# Patient Record
Sex: FEMALE | Race: ASIAN | Employment: FULL TIME | URBAN - METROPOLITAN AREA
[De-identification: names, ages, dates, MRNs, and addresses within clinical notes are randomized per-mention and may not be internally consistent; named-entity substitution may affect disease eponyms.]

---

## 2018-01-01 ENCOUNTER — TRANSFERRED RECORDS (OUTPATIENT)
Dept: MULTI SPECIALTY CLINIC | Facility: CLINIC | Age: 38
End: 2018-01-01

## 2018-01-01 LAB — PAP SMEAR - HIM PATIENT REPORTED: NEGATIVE

## 2019-09-06 ENCOUNTER — OFFICE VISIT (OUTPATIENT)
Dept: FAMILY MEDICINE | Facility: CLINIC | Age: 39
End: 2019-09-06
Payer: COMMERCIAL

## 2019-09-06 VITALS
WEIGHT: 153.4 LBS | TEMPERATURE: 98 F | BODY MASS INDEX: 30.12 KG/M2 | SYSTOLIC BLOOD PRESSURE: 121 MMHG | OXYGEN SATURATION: 99 % | HEART RATE: 66 BPM | HEIGHT: 60 IN | DIASTOLIC BLOOD PRESSURE: 76 MMHG

## 2019-09-06 DIAGNOSIS — Z13.29 SCREENING FOR THYROID DISORDER: ICD-10-CM

## 2019-09-06 DIAGNOSIS — Z00.00 ROUTINE GENERAL MEDICAL EXAMINATION AT A HEALTH CARE FACILITY: Primary | ICD-10-CM

## 2019-09-06 DIAGNOSIS — Z11.3 SCREEN FOR STD (SEXUALLY TRANSMITTED DISEASE): ICD-10-CM

## 2019-09-06 DIAGNOSIS — Z52.4 DONOR OF KIDNEY FOR TRANSPLANT: ICD-10-CM

## 2019-09-06 LAB
ANION GAP SERPL CALCULATED.3IONS-SCNC: 6 MMOL/L (ref 3–14)
BUN SERPL-MCNC: 17 MG/DL (ref 7–30)
CALCIUM SERPL-MCNC: 9.4 MG/DL (ref 8.5–10.1)
CHLORIDE SERPL-SCNC: 106 MMOL/L (ref 94–109)
CHOLEST SERPL-MCNC: 163 MG/DL
CO2 SERPL-SCNC: 27 MMOL/L (ref 20–32)
CREAT SERPL-MCNC: 0.91 MG/DL (ref 0.52–1.04)
GFR SERPL CREATININE-BSD FRML MDRD: 80 ML/MIN/{1.73_M2}
GLUCOSE SERPL-MCNC: 79 MG/DL (ref 70–99)
HDLC SERPL-MCNC: 50 MG/DL
LDLC SERPL CALC-MCNC: 96 MG/DL
NONHDLC SERPL-MCNC: 113 MG/DL
POTASSIUM SERPL-SCNC: 3.5 MMOL/L (ref 3.4–5.3)
SODIUM SERPL-SCNC: 139 MMOL/L (ref 133–144)
TRIGL SERPL-MCNC: 85 MG/DL
TSH SERPL DL<=0.005 MIU/L-ACNC: 1.27 MU/L (ref 0.4–4)

## 2019-09-06 PROCEDURE — 86780 TREPONEMA PALLIDUM: CPT | Performed by: PREVENTIVE MEDICINE

## 2019-09-06 PROCEDURE — 84443 ASSAY THYROID STIM HORMONE: CPT | Performed by: PREVENTIVE MEDICINE

## 2019-09-06 PROCEDURE — 90471 IMMUNIZATION ADMIN: CPT | Performed by: PREVENTIVE MEDICINE

## 2019-09-06 PROCEDURE — 99395 PREV VISIT EST AGE 18-39: CPT | Mod: 25 | Performed by: PREVENTIVE MEDICINE

## 2019-09-06 PROCEDURE — 86803 HEPATITIS C AB TEST: CPT | Performed by: PREVENTIVE MEDICINE

## 2019-09-06 PROCEDURE — 87340 HEPATITIS B SURFACE AG IA: CPT | Performed by: PREVENTIVE MEDICINE

## 2019-09-06 PROCEDURE — 36415 COLL VENOUS BLD VENIPUNCTURE: CPT | Performed by: PREVENTIVE MEDICINE

## 2019-09-06 PROCEDURE — 87591 N.GONORRHOEAE DNA AMP PROB: CPT | Performed by: PREVENTIVE MEDICINE

## 2019-09-06 PROCEDURE — 80061 LIPID PANEL: CPT | Performed by: PREVENTIVE MEDICINE

## 2019-09-06 PROCEDURE — 80048 BASIC METABOLIC PNL TOTAL CA: CPT | Performed by: PREVENTIVE MEDICINE

## 2019-09-06 PROCEDURE — 87491 CHLMYD TRACH DNA AMP PROBE: CPT | Performed by: PREVENTIVE MEDICINE

## 2019-09-06 PROCEDURE — 90686 IIV4 VACC NO PRSV 0.5 ML IM: CPT | Performed by: PREVENTIVE MEDICINE

## 2019-09-06 PROCEDURE — 87389 HIV-1 AG W/HIV-1&-2 AB AG IA: CPT | Performed by: PREVENTIVE MEDICINE

## 2019-09-06 ASSESSMENT — MIFFLIN-ST. JEOR: SCORE: 1300.5

## 2019-09-06 NOTE — PROGRESS NOTES
SUBJECTIVE:   CC: Crista Sims is an 39 year old woman who presents for preventive health visit.     Healthy Habits:    Do you get at least three servings of calcium containing foods daily (dairy, green leafy vegetables, etc.)? no    Amount of exercise or daily activities, outside of work: 1 day(s) per week    Problems taking medications regularly No    Medication side effects: No    Have you had an eye exam in the past two years? yes    Do you see a dentist twice per year? yes    Do you have sleep apnea, excessive snoring or daytime drowsiness?no    Moved to [place] last year and now back in [place]  Had a Pap smear there about a year ago, normal  Also donated LEFT kidney, to a cousin, had a follow up after that  Would like STD screening     Today's PHQ-2 Score:   PHQ-2 ( 1999 Pfizer) 7/1/2015 6/25/2015   Q1: Little interest or pleasure in doing things 0 0   Q2: Feeling down, depressed or hopeless 0 0   PHQ-2 Score 0 0       Abuse: Current or Past(Physical, Sexual or Emotional)- No  Do you feel safe in your environment? No    Social History     Tobacco Use     Smoking status: Never Smoker     Smokeless tobacco: Never Used   Substance Use Topics     Alcohol use: No     Alcohol/week: 0.0 oz     If you drink alcohol do you typically have >3 drinks per day or >7 drinks per week? No                     Reviewed orders with patient.  Reviewed health maintenance and updated orders accordingly - Yes  Lab work is in process  Labs reviewed in EPIC  BP Readings from Last 3 Encounters:   09/06/19 121/76   07/01/15 107/64   06/25/15 116/80    Wt Readings from Last 3 Encounters:   09/06/19 69.6 kg (153 lb 6.4 oz)   07/01/15 70.8 kg (156 lb)   06/25/15 70.5 kg (155 lb 8 oz)                  Patient Active Problem List   Diagnosis     CARDIOVASCULAR SCREENING; LDL GOAL LESS THAN 160     Ptosis, congenital, right     Obesity, Class I, BMI 30-34.9     Donor of kidney for transplant     Past Surgical History:   Procedure Laterality  Date     NO HISTORY OF SURGERY         Social History     Tobacco Use     Smoking status: Never Smoker     Smokeless tobacco: Never Used   Substance Use Topics     Alcohol use: No     Alcohol/week: 0.0 oz     Family History   Problem Relation Age of Onset     Diabetes Father      Neurologic Disorder Brother         seizures     Cancer No family hx of      Hypertension No family hx of      Cerebrovascular Disease No family hx of      Thyroid Disease No family hx of      Glaucoma No family hx of      Macular Degeneration No family hx of          Current Outpatient Medications   Medication Sig Dispense Refill     ibuprofen (ADVIL,MOTRIN) 600 MG tablet Take 1 tablet (600 mg) by mouth every 6 hours as needed for moderate pain (Patient not taking: Reported on 9/6/2019) 30 tablet 1     medroxyPROGESTERone (DEPO-PROVERA) 150 MG/ML injection Inject 1 mL into the muscle every 3 months. (Patient not taking: Reported on 9/6/2019) 0.9 mL 3     Allergies   Allergen Reactions     Nkda [No Known Drug Allergies]        Mammogram not appropriate for this patient based on age.    Pertinent mammograms are reviewed under the imaging tab.  History of abnormal Pap smear: NO - age 30-65 PAP every 5 years with negative HPV co-testing recommended  PAP / HPV 4/22/2013 8/26/2009   PAP NIL NIL     Reviewed and updated as needed this visit by clinical staff  Tobacco  Allergies  Meds  Problems  Med Hx  Surg Hx  Fam Hx  Soc Hx          Reviewed and updated as needed this visit by Provider  Tobacco  Allergies  Meds  Problems  Med Hx  Surg Hx  Fam Hx        Past Medical History:   Diagnosis Date     Donor of kidney for transplant 9/6/2019     Migraines       Past Surgical History:   Procedure Laterality Date     NO HISTORY OF SURGERY         ROS:  CONSTITUTIONAL: NEGATIVE for fever, chills, change in weight  INTEGUMENTARU/SKIN: NEGATIVE for worrisome rashes, moles or lesions  EYES: NEGATIVE for vision changes or irritation  ENT:  "NEGATIVE for ear, mouth and throat problems  RESP: NEGATIVE for significant cough or SOB  BREAST: NEGATIVE for masses, tenderness or discharge  CV: NEGATIVE for chest pain, palpitations or peripheral edema  GI: NEGATIVE for nausea, abdominal pain, heartburn, or change in bowel habits  : NEGATIVE for unusual urinary or vaginal symptoms. Periods are regular.  MUSCULOSKELETAL: NEGATIVE for significant arthralgias or myalgia  NEURO: NEGATIVE for weakness, dizziness or paresthesias  ENDOCRINE: NEGATIVE for temperature intolerance, skin/hair changes  HEME/ALLERGY/IMMUNE: NEGATIVE for bleeding problems  PSYCHIATRIC: NEGATIVE for changes in mood or affect    OBJECTIVE:   /76   Pulse 66   Temp 98  F (36.7  C) (Oral)   Ht 1.529 m (5' 0.2\")   Wt 69.6 kg (153 lb 6.4 oz)   LMP 08/26/2019   SpO2 99%   BMI 29.76 kg/m    EXAM:  GENERAL: healthy, alert and no distress  EYES: Eyes grossly normal to inspection, PERRL and conjunctivae and sclerae normal  HENT: ear canals and TM's normal, nose and mouth without ulcers or lesions  NECK: no adenopathy, no asymmetry  RESP: lungs clear to auscultation - no rales, rhonchi or wheezes  BREAST: normal without masses, tenderness or nipple discharge and no palpable axillary masses or adenopathy  CV: regular rate and rhythm, normal S1 S2, no S3 or S4, no murmur, click or rub, no peripheral edema and peripheral pulses strong  ABDOMEN: soft, nontender, no hepatosplenomegaly, no masses and bowel sounds normal  MS: no gross musculoskeletal defects noted, no edema  SKIN: no suspicious lesions or rashes  NEURO: Normal strength and tone, mentation intact and speech normal  PSYCH: mentation appears normal, affect normal/bright  LYMPH: no cervical, supraclavicular, axillary adenopathy    Diagnostic Test Results:  Labs reviewed in Epic  No results found for this or any previous visit (from the past 24 hour(s)).    ASSESSMENT/PLAN:   Crista was seen today for physical.    Diagnoses and all orders " "for this visit:    Routine general medical examination at a health care facility  -     Lipid panel reflex to direct LDL Non-fasting    Screen for STD (sexually transmitted disease)  -     HIV Antigen Antibody Combo  -     Chlamydia trachomatis PCR  -     Neisseria gonorrhoeae PCR  -     Hepatitis C antibody  -     Hepatitis B surface antigen  -     Treponema Abs w Reflex to RPR and Titer    Screening for thyroid disorder  -     TSH with free T4 reflex    Donor of kidney for transplant  -     Basic metabolic panel    Other orders  -     HC FLU VAC PRESRV FREE QUAD SPLIT VIR > 6 MONTHS IM [ [id number]]  -     ADMIN 1st VACCINE        COUNSELING:   Reviewed preventive health counseling, as reflected in patient instructions       Regular exercise       Healthy diet/nutrition       Vision screening       Immunizations    Vaccinated for: Influenza          Estimated body mass index is 29.76 kg/m  as calculated from the following:    Height as of this encounter: 1.529 m (5' 0.2\").    Weight as of this encounter: 69.6 kg (153 lb 6.4 oz).    Weight management plan: Discussed healthy diet and exercise guidelines     reports that she has never smoked. She has never used smokeless tobacco.      Counseling Resources:  ATP IV Guidelines  Pooled Cohorts Equation Calculator  Breast Cancer Risk Calculator  FRAX Risk Assessment  ICSI Preventive Guidelines  Dietary Guidelines for Americans, 2010  USDA's MyPlate  ASA Prophylaxis  Lung CA Screening    Radha Yoon MD MPH    [facility]  "

## 2019-09-06 NOTE — LETTER
September 10, 2019      Crista Sims  [address] [place] [address]    Dear Crista Sims,     STD screening did not show any infections. This included HIV, Syphilis, Chlamydia, Gonorrhea, Hepatitis B and C.   Cholesterol is excellent.   Electrolytes, glucose and kidney function are normal.   Please let me know if you have any questions and thank you for choosing [facility].     Regards,     Radha Yoon MD MPH/smj    Resulted Orders   TSH with free T4 reflex   Result Value Ref Range    TSH 1.27 0.40 - 4.00 mU/L   Basic metabolic panel   Result Value Ref Range    Sodium 139 133 - 144 mmol/L    Potassium 3.5 3.4 - 5.3 mmol/L    Chloride 106 94 - 109 mmol/L    Carbon Dioxide 27 20 - 32 mmol/L    Anion Gap 6 3 - 14 mmol/L    Glucose 79 70 - 99 mg/dL      Comment:      Non Fasting    Urea Nitrogen 17 7 - 30 mg/dL    Creatinine 0.91 0.52 - 1.04 mg/dL    GFR Estimate 80 >60 mL/min/[1.73_m2]      Comment:      Non  GFR Calc  Starting 12/18/2018, serum creatinine based estimated GFR (eGFR) will be   calculated using the Chronic Kidney Disease Epidemiology Collaboration   (CKD-EPI) equation.      GFR Estimate If Black >90 >60 mL/min/[1.73_m2]      Comment:       GFR Calc  Starting 12/18/2018, serum creatinine based estimated GFR (eGFR) will be   calculated using the Chronic Kidney Disease Epidemiology Collaboration   (CKD-EPI) equation.      Calcium 9.4 8.5 - 10.1 mg/dL   HIV Antigen Antibody Combo   Result Value Ref Range    HIV Antigen Antibody Combo Nonreactive NR^Nonreactive          Comment:      HIV-1 p24 Ag & HIV-1/HIV-2 Ab Not Detected   Chlamydia trachomatis PCR   Result Value Ref Range    Specimen Description Urine     Chlamydia Trachomatis PCR Negative NEG^Negative      Comment:      Negative for C. trachomatis rRNA by transcription mediated amplification.  A negative result by transcription mediated amplification does not preclude   the presence of C. trachomatis infection  because results are dependent on   proper and adequate collection, absence of inhibitors, and sufficient rRNA to   be detected.     Neisseria gonorrhoeae PCR   Result Value Ref Range    Specimen Descrip Urine     N Gonorrhea PCR Negative NEG^Negative      Comment:      Negative for N. gonorrhoeae rRNA by transcription mediated amplification.  A negative result by transcription mediated amplification does not preclude   the presence of N. gonorrhoeae infection because results are dependent on   proper and adequate collection, absence of inhibitors, and sufficient rRNA to   be detected.     Hepatitis C antibody   Result Value Ref Range    Hepatitis C Antibody Nonreactive NR^Nonreactive      Comment:      Assay performance characteristics have not been established for newborns,   infants, and children     Hepatitis B surface antigen   Result Value Ref Range    Hep B Surface Agn Nonreactive NR^Nonreactive   Treponema Abs w Reflex to RPR and Titer   Result Value Ref Range    Treponema Antibodies Nonreactive NR^Nonreactive   Lipid panel reflex to direct LDL Non-fasting   Result Value Ref Range    Cholesterol 163 <200 mg/dL    Triglycerides 85 <150 mg/dL      Comment:      Non Fasting    HDL Cholesterol 50 >49 mg/dL    LDL Cholesterol Calculated 96 <100 mg/dL      Comment:      Desirable:       <100 mg/dl    Non HDL Cholesterol 113 <130 mg/dL

## 2019-09-06 NOTE — PATIENT INSTRUCTIONS
Preventive Health Recommendations  Female Ages 26 - 39  Yearly exam:   See your health care provider every year in order to    Review health changes.     Discuss preventive care.      Review your medicines if you your doctor has prescribed any.    Until age 30: Get a Pap test every three years (more often if you have had an abnormal result).    After age 30: Talk to your doctor about whether you should have a Pap test every 3 years or have a Pap test with HPV screening every 5 years.   You do not need a Pap test if your uterus was removed (hysterectomy) and you have not had cancer.  You should be tested each year for STDs (sexually transmitted diseases), if you're at risk.   Talk to your provider about how often to have your cholesterol checked.  If you are at risk for diabetes, you should have a diabetes test (fasting glucose).  Shots: Get a flu shot each year. Get a tetanus shot every 10 years.   Nutrition:     Eat at least 5 servings of fruits and vegetables each day.    Eat whole-grain bread, whole-wheat pasta and brown rice instead of white grains and rice.    Get adequate Calcium and Vitamin D.     Lifestyle    Exercise at least 150 minutes a week (30 minutes a day, 5 days of the week). This will help you control your weight and prevent disease.    Limit alcohol to one drink per day.    No smoking.     Wear sunscreen to prevent skin cancer.    See your dentist every six months for an exam and cleaning.    At [facility], we strive to deliver an exceptional experience to you, every time we see you.  If you receive a survey in the mail, please send us back your thoughts. We really do value your feedback.    Your care team:                            Family Medicine Internal Medicine   MD Guevara Rae MD Shantel Branch-Fleming, MD Katya Georgiev PA-C Megan Hill, APRN CNP Nam Ho, MD Pediatrics   JIM Meza, MD Ingrid Thomas  MD Earline Shaikh CNP, MD Deborah Mielke, MD Kim Thein, APRN CNP      Clinic hours: Monday - Thursday 7 am-7 pm; Fridays 7 am-5 pm.   Urgent care: Monday - Friday 11 am-9 pm; Saturday and Sunday 9 am-5 pm.  Pharmacy : Monday -Thursday 8 am-8 pm; Friday 8 am-6 pm; Saturday and Sunday 9 am-5 pm.     Clinic: [phone]   Pharmacy: [phone]

## 2019-09-08 LAB — T PALLIDUM AB SER QL: NONREACTIVE

## 2019-09-09 LAB
HBV SURFACE AG SERPL QL IA: NONREACTIVE
HCV AB SERPL QL IA: NONREACTIVE
HIV 1+2 AB+HIV1 P24 AG SERPL QL IA: NONREACTIVE

## 2019-09-10 LAB
C TRACH DNA SPEC QL NAA+PROBE: NEGATIVE
N GONORRHOEA DNA SPEC QL NAA+PROBE: NEGATIVE
SPECIMEN SOURCE: NORMAL
SPECIMEN SOURCE: NORMAL

## 2019-09-10 NOTE — RESULT ENCOUNTER NOTE
Please send a letter:    Dear Crista Sims,    STD screening did not show any infections. This included HIV, Syphilis, Chlamydia, Gonorrhea, Hepatitis B and C.   Cholesterol is excellent.  Electrolytes, glucose and kidney function are normal.  Please let me know if you have any questions and thank you for choosing [facility].    Regards,    Radha Yoon MD MPH

## 2019-11-04 ENCOUNTER — HEALTH MAINTENANCE LETTER (OUTPATIENT)
Age: 39
End: 2019-11-04

## 2020-03-23 ENCOUNTER — VIRTUAL VISIT (OUTPATIENT)
Dept: FAMILY MEDICINE | Facility: OTHER | Age: 40
End: 2020-03-23

## 2020-03-24 NOTE — PROGRESS NOTES
"Date: 2020 19:48:01  Clinician: David Scanlon  Clinician NPI: [id number]  Patient: Crista Sims  Patient : 1980  Patient Address: [address] [place] [address]  Patient Phone: [phone]  Visit Protocol: URI  Patient Summary:  Crista is a 39 year old ( : 1980 ) female who initiated a Visit for COVID-19 (Coronavirus) evaluation and screening. When asked the question \"Please sign me up to receive news, health information and promotions from [employer].\", Crista responded \"No\".    Crista states her symptoms started gradually 3-6 days ago.   Her symptoms consist of myalgia, a sore throat, a cough, malaise, and chills. Crista also feels feverish but was unable to measure her temperature.   Symptom details     Cough: Crista coughs a few times an hour and her cough is more bothersome at night. Phlegm does not come into her throat when she coughs. She does not believe her cough is caused by post-nasal drip.     Sore throat: Crista reports having moderate throat pain (4-6 on a 10 point pain scale), does not have exudate on her tonsils, and can swallow liquids. She is not sure if the lymph nodes in her neck are enlarged. A rash has not appeared on the skin since the sore throat started.      Crista denies having ear pain, rhinitis, facial pain or pressure, wheezing, nasal congestion, teeth pain, and headache. She also denies double sickening (worsening symptoms after initial improvement), taking antibiotic medication for the symptoms, and having recent facial or sinus surgery in the past 60 days. She is not experiencing dyspnea.   Precipitating events  Within the past week, Crista has not been exposed to someone with strep throat. She has not recently been exposed to someone with influenza. Crista has not been in close contact with any high risk individuals.   Pertinent COVID-19 (Coronavirus) information  Crista has not traveled internationally or to the areas where COVID-19 (Coronavirus) is widespread, including cruise ship " travel in the last 14 days before the start of her symptoms.   Crista has not had a close contact with a laboratory-confirmed COVID-19 patient within 14 days of symptom onset. She also has not had a close contact with a suspected COVID-19 patient within 14 days of symptom onset.   Crista is not a healthcare worker and does not work in a healthcare facility.   Pertinent medical history  Crista does not get yeast infections when she takes antibiotics.   Crista does not need a return to work/school note.   Weight: 157 lbs   Crista does not smoke or use smokeless tobacco.   She denies pregnancy and denies breastfeeding. She has menstruated in the past month.   Weight: 157 lbs    MEDICATIONS: No current medications, ALLERGIES: NKDA  Clinician Response:  Dear Crista,   Based on the information you have provided, you do have symptoms that are consistent with Coronavirus (COVID-19).   The coronavirus causes mild to severe respiratory illness with the most common symptoms including fever, cough and difficulty breathing. Unfortunately, many viruses cause similar symptoms and it can be difficult to distinguish between viruses, especially in mild cases, so we are presuming that anyone with cough or fever has coronavirus at this time.  Coronavirus/COVID-19 has reached the point of community spread in [place], meaning that we are finding the virus in people with no known exposure risk for kash the virus. Given the increasing commonness of coronavirus in the community we are no longer testing patients who are not critically ill.  If you are a health care worker, you should refer to your employee health office for instructions about testing and returning to work.  For everyone else who has cough or fever, you should assume you are infected with coronavirus. Since you will not be tested but have symptoms that may be consistent with coronavirus, the CDC recommends you stay in self-isolation until these three things have happened:    You have  had no fever for at least 72 hours (that is three full days of no fever without the use of medicine that reduces fevers)    AND   Other symptoms have improved (for example, when your cough or shortness of breath have improved)   AND   At least 7 days have passed since your symptoms first appeared.   How to Isolate:    Isolate yourself at home.   Do Not allow any visitors  Do Not go to work or school  Do Not go to [employer],  centers, shopping, or other public places.  Do Not shake hands.  Avoid close contact with others (hugging, kissing).   Protect Others:    Cover Your Mouth and Nose with a mask, disposable tissue or wash cloth to avoid spreading germs to others.  Wash your hands and face frequently with soap and water.   Managing Symptoms:    At this time, we primarily recommend Tylenol (Acetaminophen) for fever or pain. If you have liver or kidney problems, contact your primary care provider for instructions on use of tylenol. Adults can take 650 mg (two 325 mg pills) by mouth every 4-6 hours as needed OR 1,000 mg (two 500 mg pills) every 8 hours as needed. MAXIMUM DAILY DOSE: 3,000mg. For children, refer to dosing on bottle based on age or weight.   If you develop significant shortness of breath that prevents you from doing normal activities, please call 911 or proceed to the nearest emergency room and alert them immediately that you have been in self-isolation for possible coronavirus.   For more information about COVID19 and options for caring for yourself at home, please visit the CDC website at https://www.cdc.gov/coronavirus/2019-ncov/about/steps-when-sick.htmlFor more options for care at [facility], please visit our website at [email or web address]     Diagnosis: Cough  Diagnosis ICD: R05

## 2020-11-22 ENCOUNTER — HEALTH MAINTENANCE LETTER (OUTPATIENT)
Age: 40
End: 2020-11-22

## 2021-04-30 ENCOUNTER — OFFICE VISIT (OUTPATIENT)
Dept: OBGYN | Facility: CLINIC | Age: 41
End: 2021-04-30
Payer: COMMERCIAL

## 2021-04-30 VITALS
DIASTOLIC BLOOD PRESSURE: 73 MMHG | HEIGHT: 60 IN | WEIGHT: 156 LBS | BODY MASS INDEX: 30.63 KG/M2 | SYSTOLIC BLOOD PRESSURE: 104 MMHG | HEART RATE: 78 BPM | OXYGEN SATURATION: 98 %

## 2021-04-30 DIAGNOSIS — Z31.41 FERTILITY TESTING: ICD-10-CM

## 2021-04-30 DIAGNOSIS — Z31.69 ENCOUNTER FOR PRECONCEPTION CONSULTATION: Primary | ICD-10-CM

## 2021-04-30 PROBLEM — N97.9 FEMALE INFERTILITY: Status: ACTIVE | Noted: 2021-04-30

## 2021-04-30 PROCEDURE — 99203 OFFICE O/P NEW LOW 30 MIN: CPT | Performed by: OBSTETRICS & GYNECOLOGY

## 2021-04-30 ASSESSMENT — MIFFLIN-ST. JEOR: SCORE: 1307.29

## 2021-04-30 NOTE — PROGRESS NOTES
OB-GYN Problem-Oriented Visit or Consultation      Crista Sims is a 40 year old year old P 3 who presents with a chief complaint of infertilty.  Referred by self.  Patient's last menstrual period was 04/20/2021.    Assessment:   Encounter Diagnoses   Name Primary?     Encounter for preconception consultation Yes     Fertility testing          Plan and Recommendations: Check D21P, D3 hormonal profile, D6-8 HSG, and partner to do semen analysis at his clinic. Check diagnostics then treatment or RE referral.  May do home OPK testing.   I reviewed the condition, causes, differential diagnosis, prognosis, evaluation and management considerations and options.  Questions answered and information given. See orders.   Preconception counseling reviewed as needed including cycle timing and optimization, medical status, meds, vaccines, exposures, nutrition, folic acid, family history, genetic screening (CF carrier scr, etc.), social issues and any applicable specific risk factors.   AMA noted.   35 minutes spent on the date of encounter doing chart review, history, discussion with patient, and documentation, and further activities as noted, and review of appropriateness of decision-making for care.    A/P:  Crista was seen today for consult.    Diagnoses and all orders for this visit:    Encounter for preconception consultation    Fertility testing  -     Progesterone; Future  -     XR Hysterosalpingogram; Future        Tyron Hoyt MD    HPI:     See prior notes. In the interval worked through relationship stress, had used DepoP, Nexplanon, and DepoP for one dose about two yrs ago. Donated her left kidney to a cousin and doing well. Family doing well.     Attempting conception for over one year. Menses q 28-30 days x 3-4 days. LMP 4/20.   Sex freq seems adeq and without dyspareunia. No pertinent fertility related symptoms except some occasional headaches. Checked home OPK once and it was positive mid-cycle. No medical evaluation so  "far.     Past medical, obstetrical, surgical, family and social history reviewed and as noted or updated in chart.     Allergies, meds and supplements are as noted or updated in chart.      ROS:   Systems reviewed include:  constitutional, breast, cardiac, respiratory, gastrointestinal, genitourinary, musculoskeletal, integumentary, psychological, hematologic/lymphatic and endocrine.    These systems were negative for significant symptoms except for the following additional: none; see HPI.    EXAM:  VS as noted. /73 (BP Location: Left arm, Patient Position: Chair, Cuff Size: Adult Regular)   Pulse 78   Ht 1.529 m (5' 0.2\")   Wt 70.8 kg (156 lb)   LMP 04/20/2021   SpO2 98%   Breastfeeding No   BMI 30.26 kg/m    Constitutionally normal.     Exam not repeated at this time.    Tyron Hoyt MD            "

## 2021-04-30 NOTE — PATIENT INSTRUCTIONS
If you have any questions regarding your visit, Please contact your care team.     [employer] [facility]: [phone]  Women s Health CLINIC HOURS TELEPHONE NUMBER       Tyron Hoyt M.D.    [facility]-LAVONNE [place]-LAVONNE [place]-Medical Assistant    [facility]-  Angie-     Monday-[place]  8:00a.m-4:45 p.m  Tuesday-[place]  9:00a.m-4:00 p.m  Wednesday-[place] 8:00a.m-4:45 p.m.  Thursday-[place]  8:00a.m-4:45 p.m.  Friday-[place]  8:00a.m-4:45 p.m. [facility]  [address] [place] [id number] [phone] ask for [facility]  [phone] Fax  Imaging [phone]    [facility]  [address]  [place], [place] [address] [phone]    [facility]  [address] [place]  [place] [address] [phone] ask [facility]  [phone] Fax  Imaging [phone]     Urgent Care locations:    [place]        [place] Monday-Friday  5 pm - 9 pm  Saturday and Sunday   9 am - 5 pm  Monday-Friday   11 am - 9 pm  Saturday and Sunday   9 am - 5 pm   [phone]   If you need a medication refill, please contact your pharmacy. Please allow 3 business days for your refill to be completed.  As always, Thank you for trusting us with your healthcare needs!

## 2021-05-03 ENCOUNTER — IMMUNIZATION (OUTPATIENT)
Dept: PEDIATRICS | Facility: CLINIC | Age: 41
End: 2021-05-03

## 2021-05-03 PROCEDURE — 0001A PR COVID VAC PFIZER DIL RECON 30 MCG/0.3 ML IM: CPT

## 2021-05-03 PROCEDURE — 91300 PR COVID VAC PFIZER DIL RECON 30 MCG/0.3 ML IM: CPT

## 2021-05-11 DIAGNOSIS — Z31.41 FERTILITY TESTING: ICD-10-CM

## 2021-05-11 LAB — PROGEST SERPL-MCNC: 7.7 NG/ML

## 2021-05-11 PROCEDURE — 36415 COLL VENOUS BLD VENIPUNCTURE: CPT | Performed by: OBSTETRICS & GYNECOLOGY

## 2021-05-11 PROCEDURE — 84144 ASSAY OF PROGESTERONE: CPT | Performed by: OBSTETRICS & GYNECOLOGY

## 2021-05-24 ENCOUNTER — IMMUNIZATION (OUTPATIENT)
Dept: PEDIATRICS | Facility: CLINIC | Age: 41
End: 2021-05-24
Attending: INTERNAL MEDICINE
Payer: COMMERCIAL

## 2021-05-24 PROCEDURE — 0002A PR COVID VAC PFIZER DIL RECON 30 MCG/0.3 ML IM: CPT

## 2021-05-24 PROCEDURE — 91300 PR COVID VAC PFIZER DIL RECON 30 MCG/0.3 ML IM: CPT

## 2021-09-18 ENCOUNTER — HEALTH MAINTENANCE LETTER (OUTPATIENT)
Age: 41
End: 2021-09-18

## 2022-01-08 ENCOUNTER — HEALTH MAINTENANCE LETTER (OUTPATIENT)
Age: 42
End: 2022-01-08

## 2022-08-23 NOTE — PROGRESS NOTES
tamiko is a 42 year old who is being evaluated via a billable telephone visit.      What phone number would you like to be contacted at? [phone]  How would you like to obtain your AVS? MyChart

## 2022-08-23 NOTE — PATIENT INSTRUCTIONS
If you have any questions regarding your visit, Please contact your care team.    To Schedule an Appointment 24/7  Call: [phone]  Women s Health  TELEPHONE NUMBER   Collin Lemon M.D.    [place]-Medical Assistant    Grzegorz York-LAVONNE [place]-Surgery Scheduler  Angie-Surgery Scheduler Tuesday-[place]                   7:30 a.m.-3:30 p.m.  Wednesday-[place]             8:00 a.m.-4:30 p.m.  Thursday-[place]     8:00 a.m.-4:00 p.m.  Friday-[place]                       7:30 a.m.-1:00 p.m. [address] [id number] [phone] Fax    Imaging Scheduling all locations  [phone]     [facility] Delivery  [address]  [place], [place] [address] [phone]    [facility]  [address] [place] [address] [phone] ask for Women's Clinic     **Surgeries** Our Surgery Schedulers will contact you to schedule. If you do not receive a call within 3 business days, please call [phone].    Urgent Care locations:  [facility] Monday-Friday                 10 am - 8 pm  Saturday and Sunday        9 am - 5 pm   [phone]   If you need a medication refill, please contact your pharmacy. Please allow 3 business days for your refill to be completed.  As always, Thank you for trusting us with your healthcare needs!  If you have any questions regarding your visit, Please contact your care team.    [facility]: [phone]    To Schedule an Appointment 24/7  Call: [phone]    see additional instructions from your care team below

## 2022-08-24 ENCOUNTER — VIRTUAL VISIT (OUTPATIENT)
Dept: OBGYN | Facility: CLINIC | Age: 42
End: 2022-08-24
Payer: COMMERCIAL

## 2022-08-24 DIAGNOSIS — Z83.3 FAMILY HISTORY OF DIABETES MELLITUS: ICD-10-CM

## 2022-08-24 DIAGNOSIS — Z98.890 HISTORY OF ELECTIVE ABORTION: ICD-10-CM

## 2022-08-24 DIAGNOSIS — N97.9 SECONDARY FEMALE INFERTILITY: Primary | ICD-10-CM

## 2022-08-24 PROCEDURE — 99214 OFFICE O/P EST MOD 30 MIN: CPT | Mod: 95 | Performed by: OBSTETRICS & GYNECOLOGY

## 2022-08-24 NOTE — PROGRESS NOTES
INFERTILITY:      Secondary infertility  Length of time for attempting pregnancy:  1.5 years.     Ovulatory factor:  Menarche:  13 years old.    Interval:  Monthly   Regular  Duration:  3 days.  Moliminal symptoms:  Breast tenderness:  no       Bloating sensation:  Sometimes   LMP  About 3 weeks ago.      Pregnancy history:  OB History    Para Term  AB Living   8 3 1 2 5 3   SAB IAB Ectopic Multiple Live Births   0 5 0 0 3      # Outcome Date GA Lbr Chris/2nd Weight Sex Delivery Anes PTL Lv   8 IAB 13     IAB      7  11 36w1d 03:00 / 00:15 2.551 kg (5 lb 10 oz) M Vag-Spont None  JUAN RAMON      Birth Comments:  labor      Name: Vicente      Apgar1: 9  Apgar5: 9   6  01/11/10 33w1d  2.22 kg (4 lb 14.3 oz) M  None  JUAN RAMON      Birth Comments: PPROM      Name: Ruchi   5 Term  40w0d 06:00 2.155 kg (4 lb 12 oz) F Vag-Spont None  JUAN RAMON      Birth Comments: none      Name: Danni   4 IAB      IAB      3 IAB      IAB      2 IAB      IAB      1 IAB      IAB          BBT chart   Instructions reviewed.  Day 21 Progesterone level 7.7 (2021)     Clomid use:      Tubal factor:  STD's:  No      PID:  No    IUD:  No   Prior abdominal and pelvic surgeries:  Kidney donation.   GC/Chlamydia testing:  Negative (2019)   HSG:    Laparoscopy:  No     Uterine factor:  Prior uterine surgeries:  She has had 4 or 5 elective terminations, with suction  Prior uterine infections:  No   HSG:  No  Laparoscopy:  No  EMB:      Cervical factor:  STD's:  No  Abnormal pap smears:  No  Cervical surgeries:  She has had  To 5 elective terminations with suction   GC/Chlamydia:   Negative (2019)   Post coital test:      Male factor:    General health:  Good.  No medical problems.    Father of prior pregnancies:  Yes,  See above pregnancy history    Boxers:  yes  Briefs:  yes  Semen Analysis:                      First:                    Second:    Social factor:  Durant frequency:  2 to 3 times a  week  Lubrication used:  No lubrication used   Recumbent position:  She either falls asleep or she gets up and washes herself.      Occupation of Patient:   at [employer]   Chemical or toxin exposure at home or work:  No   Tobacco:  no/Ethanol:  no/Street drug use:  no    Occupation of Partner:     Chemical or toxin exposure at home or work:  No   Tobacco:  yes/Ethanol:  yes/Street drug use:  yes    Patient's past medical history:  Past Medical History:   Diagnosis Date     Donor of kidney for transplant 2018     Family history of diabetes mellitus     adult onset, father     Female infertility 2021     Migraines      Past Surgical History:   Procedure Laterality Date     AS LAP,DONOR KIDNEY REMOV,LIVING Left 2018     HC INSERT IMPLANTABLE CONTRACEPTIVE CAPSULE      Nexplanon     HC REMOVAL IMPLATABLE CONTRACEPTIVE CAPSULE         No current outpatient medications on file.     Social History     Tobacco Use     Smoking status: Never Smoker     Smokeless tobacco: Never Used   Substance Use Topics     Alcohol use: No     Alcohol/week: 0.0 standard drinks     Drug use: No       Family History   Problem Relation Age of Onset     Diabetes Father      Neurologic Disorder Brother         seizures     Cancer No family hx of      Hypertension No family hx of      Cerebrovascular Disease No family hx of      Thyroid Disease No family hx of      Glaucoma No family hx of      Macular Degeneration No family hx of        LMP 2022 (Approximate)   Breastfeeding No   General:  WNWD female, NAD  Alert  Oriented x 3  Lungs:  Good respirations, without labored breathing.        Assessment  Secondary infertility   History of   Family history of diabetes.        Plan  We discussed the workup for infertility and the goals and limitations.    Prenatal vitamin for the folic acid supplementation.    She will need to have the day 3 labs.  Once she has the day 3 labs she will  need to call me to place the other tests.    The first labs on day 3 are the following:    E2 and FSH  After the lab draw, she will need to call me and I will place the additional labs:    TSH, PRL, FSH, LH, Progesterone level.   She will need to have the 2 hour OGT for family history of diabetes.        Telephone time:  31 minutes.     Collin Lemon MD

## 2022-11-20 ENCOUNTER — HEALTH MAINTENANCE LETTER (OUTPATIENT)
Age: 42
End: 2022-11-20

## 2022-12-28 ENCOUNTER — TRANSFERRED RECORDS (OUTPATIENT)
Dept: MULTI SPECIALTY CLINIC | Facility: CLINIC | Age: 42
End: 2022-12-28

## 2022-12-28 LAB
HPV ABSTRACT: NORMAL
PAP-ABSTRACT: NORMAL

## 2023-04-15 ENCOUNTER — HEALTH MAINTENANCE LETTER (OUTPATIENT)
Age: 43
End: 2023-04-15

## 2023-09-26 ENCOUNTER — OFFICE VISIT (OUTPATIENT)
Dept: FAMILY MEDICINE | Facility: CLINIC | Age: 43
End: 2023-09-26
Payer: COMMERCIAL

## 2023-09-26 VITALS
HEIGHT: 60 IN | BODY MASS INDEX: 32.59 KG/M2 | TEMPERATURE: 98.1 F | WEIGHT: 166 LBS | SYSTOLIC BLOOD PRESSURE: 105 MMHG | HEART RATE: 91 BPM | DIASTOLIC BLOOD PRESSURE: 69 MMHG | OXYGEN SATURATION: 99 % | RESPIRATION RATE: 16 BRPM

## 2023-09-26 DIAGNOSIS — Z23 ENCOUNTER FOR IMMUNIZATION: ICD-10-CM

## 2023-09-26 DIAGNOSIS — Z11.3 SCREEN FOR STD (SEXUALLY TRANSMITTED DISEASE): ICD-10-CM

## 2023-09-26 DIAGNOSIS — Z12.31 ENCOUNTER FOR SCREENING MAMMOGRAM FOR BREAST CANCER: ICD-10-CM

## 2023-09-26 DIAGNOSIS — Z00.00 ROUTINE GENERAL MEDICAL EXAMINATION AT A HEALTH CARE FACILITY: Primary | ICD-10-CM

## 2023-09-26 PROCEDURE — 87389 HIV-1 AG W/HIV-1&-2 AB AG IA: CPT | Performed by: PREVENTIVE MEDICINE

## 2023-09-26 PROCEDURE — 90472 IMMUNIZATION ADMIN EACH ADD: CPT | Performed by: PREVENTIVE MEDICINE

## 2023-09-26 PROCEDURE — 87591 N.GONORRHOEAE DNA AMP PROB: CPT | Performed by: PREVENTIVE MEDICINE

## 2023-09-26 PROCEDURE — 86803 HEPATITIS C AB TEST: CPT | Performed by: PREVENTIVE MEDICINE

## 2023-09-26 PROCEDURE — 87340 HEPATITIS B SURFACE AG IA: CPT | Performed by: PREVENTIVE MEDICINE

## 2023-09-26 PROCEDURE — 99386 PREV VISIT NEW AGE 40-64: CPT | Mod: 25 | Performed by: PREVENTIVE MEDICINE

## 2023-09-26 PROCEDURE — 90714 TD VACC NO PRESV 7 YRS+ IM: CPT | Performed by: PREVENTIVE MEDICINE

## 2023-09-26 PROCEDURE — 90471 IMMUNIZATION ADMIN: CPT | Performed by: PREVENTIVE MEDICINE

## 2023-09-26 PROCEDURE — 82947 ASSAY GLUCOSE BLOOD QUANT: CPT | Performed by: PREVENTIVE MEDICINE

## 2023-09-26 PROCEDURE — 80061 LIPID PANEL: CPT | Performed by: PREVENTIVE MEDICINE

## 2023-09-26 PROCEDURE — 86780 TREPONEMA PALLIDUM: CPT | Performed by: PREVENTIVE MEDICINE

## 2023-09-26 PROCEDURE — 36415 COLL VENOUS BLD VENIPUNCTURE: CPT | Performed by: PREVENTIVE MEDICINE

## 2023-09-26 PROCEDURE — 90686 IIV4 VACC NO PRSV 0.5 ML IM: CPT | Performed by: PREVENTIVE MEDICINE

## 2023-09-26 PROCEDURE — 87491 CHLMYD TRACH DNA AMP PROBE: CPT | Performed by: PREVENTIVE MEDICINE

## 2023-09-26 ASSESSMENT — ENCOUNTER SYMPTOMS
DIARRHEA: 0
FREQUENCY: 0
FEVER: 0
HEMATOCHEZIA: 0
WEAKNESS: 0
NERVOUS/ANXIOUS: 0
SHORTNESS OF BREATH: 0
JOINT SWELLING: 0
HEMATURIA: 0
SORE THROAT: 0
BREAST MASS: 0
EYE PAIN: 0
HEADACHES: 0
ABDOMINAL PAIN: 0
HEARTBURN: 0
NAUSEA: 0
PARESTHESIAS: 0
CONSTIPATION: 0
PALPITATIONS: 0
CHILLS: 0
COUGH: 0
ARTHRALGIAS: 0
DYSURIA: 0
DIZZINESS: 0
MYALGIAS: 0

## 2023-09-26 ASSESSMENT — PAIN SCALES - GENERAL: PAINLEVEL: NO PAIN (0)

## 2023-09-26 NOTE — NURSING NOTE
Prior to immunization administration, verified patients identity using patient s name and date of birth. Please see Immunization Activity for additional information.     Screening Questionnaire for Adult Immunization    Are you sick today?   No   Do you have allergies to medications, food, a vaccine component or latex?   No   Have you ever had a serious reaction after receiving a vaccination?   No   Do you have a long-term health problem with heart, lung, kidney, or metabolic disease (e.g., diabetes), asthma, a blood disorder, no spleen, complement component deficiency, a cochlear implant, or a spinal fluid leak?  Are you on long-term aspirin therapy?   No   Do you have cancer, leukemia, HIV/AIDS, or any other immune system problem?   No   Do you have a parent, brother, or sister with an immune system problem?   No   In the past 3 months, have you taken medications that affect  your immune system, such as prednisone, other steroids, or anticancer drugs; drugs for the treatment of rheumatoid arthritis, Crohn s disease, or psoriasis; or have you had radiation treatments?   No   Have you had a seizure, or a brain or other nervous system problem?   No   During the past year, have you received a transfusion of blood or blood    products, or been given immune (gamma) globulin or antiviral drug?   No   For women: Are you pregnant or is there a chance you could become       pregnant during the next month?   No   Have you received any vaccinations in the past 4 weeks?   No     Immunization questionnaire answers were all negative.      Patient instructed to remain in clinic for 15 minutes afterwards, and to report any adverse reactions.     Screening performed by Margarita Sims MA on 9/26/2023 at 5:09 PM.

## 2023-09-26 NOTE — PATIENT INSTRUCTIONS
At [facility], we strive to deliver an exceptional experience to you, every time we see you. If you receive a survey, please complete it as we do value your feedback.  If you have MyChart, you can expect to receive results automatically within 24 hours of their completion.  Your provider will send a note interpreting your results as well.   If you do not have MyChart, you should receive your results in about a week by mail.    Your care team:                            Family Medicine Internal Medicine   MD Guevara Rae, MD Bennie Hernandez MD Katya Belousova, JIM Abrams, MD Pediatrics   Martínez Vora, PAMD Mana Del Angel MD Amelia Massimini APRN CNP   YUDELKA Vora CNP, MD Charanya Pasupathi, MD Kathleen Widmer, NP coming October 2023 Same-Day (No follow up visit)    JIM Méndez PA coming Oct 2023     Clinic hours: Monday - Thursday 7 am-6 pm; Fridays 7 am-5 pm.   Urgent care: Monday - Friday 10 am- 8 pm; Saturday and Sunday 9 am-5 pm.    Clinic: [phone]       [facility]: Monday - Thursday 8 am - 7 pm; Friday 8 am - 6 pm  [facility]: [phone]       Preventive Health Recommendations  Female Ages 40 to 49    Yearly exam:   See your health care provider every year in order to  Review health changes.   Discuss preventive care.    Review your medicines if your doctor prescribed any.    Get a Pap test every three years (unless you have an abnormal result and your provider advises testing more often).    If you get Pap tests with HPV test, you only need to test every 5 years, unless you have an abnormal result. You do not need a Pap test if your uterus was removed (hysterectomy) and you have not had cancer.    You should be tested each year for STDs (sexually transmitted diseases), if you're  at risk.   Ask your doctor if you should have a mammogram.    Have a colonoscopy (test for colon cancer) if someone in your family has had colon cancer or polyps before age 50.     Have a cholesterol test every 5 years.     Have a diabetes test (fasting glucose) after age 45. If you are at risk for diabetes, you should have this test every 3 years.    Shots: Get a flu shot each year. Get a tetanus shot every 10 years.     Nutrition:   Eat at least 5 servings of fruits and vegetables each day.  Eat whole-grain bread, whole-wheat pasta and brown rice instead of white grains and rice.  Get adequate Calcium and Vitamin D.      Lifestyle  Exercise at least 150 minutes a week (an average of 30 minutes a day, 5 days a week). This will help you control your weight and prevent disease.  Limit alcohol to one drink per day.  No smoking.   Wear sunscreen to prevent skin cancer.  See your dentist every six months for an exam and cleaning.

## 2023-09-26 NOTE — PROGRESS NOTES
SUBJECTIVE:   CC: tamiko is an 43 year old who presents for preventive health visit.       9/26/2023     4:43 PM   Additional Questions   Roomed by MIGUELITO   Accompanied by SELF       Healthy Habits:     Getting at least 3 servings of Calcium per day:  NO    Bi-annual eye exam:  NO    Dental care twice a year:  Yes    Sleep apnea or symptoms of sleep apnea:  None    Diet:  Other    Frequency of exercise:  2-3 days/week    Duration of exercise:  30-45 minutes    Taking medications regularly:  Yes    Medication side effects:  None    Additional concerns today:  No    Has been walking 6 miles a day    Pap smear done 12/28/22,normal.     Today's PHQ-2 Score:       9/26/2023     4:33 PM   PHQ-2 ( 1999 [employer])   Q1: Little interest or pleasure in doing things 0   Q2: Feeling down, depressed or hopeless 0   PHQ-2 Score 0   Q1: Little interest or pleasure in doing things Not at all   Q2: Feeling down, depressed or hopeless Not at all   PHQ-2 Score 0     Have you ever done Advance Care Planning? (For example, a Health Directive, POLST, or a discussion with a medical provider or your loved ones about your wishes): No, advance care planning information given to patient to review.  Patient plans to discuss their wishes with loved ones or provider.      Social History     Tobacco Use    Smoking status: Never    Smokeless tobacco: Never   Substance Use Topics    Alcohol use: No     Alcohol/week: 0.0 standard drinks of alcohol             9/26/2023     4:33 PM   Alcohol Use   Prescreen: >3 drinks/day or >7 drinks/week? Not Applicable          No data to display              Reviewed orders with patient.  Reviewed health maintenance and updated orders accordingly - Yes  Lab work is in process  Labs reviewed in EPIC  BP Readings from Last 3 Encounters:   09/26/23 105/69   04/30/21 104/73   09/06/19 121/76    Wt Readings from Last 3 Encounters:   09/26/23 75.3 kg (166 lb)   04/30/21 70.8 kg (156 lb)   09/06/19 69.6 kg (153 lb 6.4 oz)                   Patient Active Problem List   Diagnosis    CARDIOVASCULAR SCREENING; LDL GOAL LESS THAN 160    Ptosis, congenital, right    Obesity, Class I, BMI 30-34.9    Donor of kidney for transplant    Female infertility     Past Surgical History:   Procedure Laterality Date    AS LAP,DONOR KIDNEY REMOV,LIVING Left 2018    HC INSERT IMPLANTABLE CONTRACEPTIVE CAPSULE  2015    Nexplanon    HC REMOVAL IMPLATABLE CONTRACEPTIVE CAPSULE  2018       Social History     Tobacco Use    Smoking status: Never    Smokeless tobacco: Never   Substance Use Topics    Alcohol use: No     Alcohol/week: 0.0 standard drinks of alcohol     Family History   Problem Relation Age of Onset    Diabetes Father     Neurologic Disorder Brother         seizures    Cancer No family hx of     Hypertension No family hx of     Cerebrovascular Disease No family hx of     Thyroid Disease No family hx of     Glaucoma No family hx of     Macular Degeneration No family hx of          No current outpatient medications on file.     Allergies   Allergen Reactions    Nkda [No Known Drug Allergy]        Breast Cancer Screening:    FHS-7:        No data to display              click delete button to remove this line now  Mammogram Screening - Offered annual screening and updated Health Maintenance for mutual plan based on risk factor consideration  Pertinent mammograms are reviewed under the imaging tab.    History of abnormal Pap smear: NO - age 30-65 PAP every 5 years with negative HPV co-testing recommended      4/22/2013    12:00 AM 8/26/2009    12:00 AM   PAP / HPV   PAP (Historical) NIL  NIL      Reviewed and updated as needed this visit by clinical staff   Tobacco  Allergies  Meds  Problems  Med Hx  Surg Hx  Fam Hx          Reviewed and updated as needed this visit by Provider   Tobacco  Allergies  Meds  Problems  Med Hx  Surg Hx  Fam Hx         Past Medical History:   Diagnosis Date    Donor of kidney for transplant 2018    Family  history of diabetes mellitus     adult onset, father    Female infertility 04/30/2021    Migraines       Past Surgical History:   Procedure Laterality Date    AS LAP,DONOR KIDNEY REMOV,LIVING Left 2018    HC INSERT IMPLANTABLE CONTRACEPTIVE CAPSULE  2015    Nexplanon    HC REMOVAL IMPLATABLE CONTRACEPTIVE CAPSULE  2018       Review of Systems   Constitutional:  Negative for chills and fever.   HENT:  Negative for congestion, ear pain, hearing loss and sore throat.    Eyes:  Negative for pain and visual disturbance.   Respiratory:  Negative for cough and shortness of breath.    Cardiovascular:  Negative for chest pain, palpitations and peripheral edema.   Gastrointestinal:  Negative for abdominal pain, constipation, diarrhea, heartburn, hematochezia and nausea.   Breasts:  Negative for tenderness, breast mass and discharge.   Genitourinary:  Negative for dysuria, frequency, genital sores, hematuria, pelvic pain, urgency, vaginal bleeding and vaginal discharge.   Musculoskeletal:  Negative for arthralgias, joint swelling and myalgias.   Skin:  Negative for rash.   Neurological:  Negative for dizziness, weakness, headaches and paresthesias.   Psychiatric/Behavioral:  Negative for mood changes. The patient is not nervous/anxious.         OBJECTIVE:   /69 (BP Location: Left arm, Patient Position: Chair, Cuff Size: Adult Large)   Pulse 91   Temp 98.1  F (36.7  C) (Oral)   Resp 16   Ht 1.524 m (5')   Wt 75.3 kg (166 lb)   LMP 09/15/2023   SpO2 99%   BMI 32.42 kg/m    Physical Exam  GENERAL APPEARANCE: healthy, alert and no distress  EYES: Eyes grossly normal to inspection and conjunctivae and sclerae normal  NECK: no adenopathy and trachea midline and normal to palpation  RESP: lungs clear to auscultation - no rales, rhonchi or wheezes  CV: regular rates and rhythm, normal S1 S2, no S3 or S4 and no murmur, click or rub  ABDOMEN: soft, non-tender and no rebound or guarding   MS: extremities normal- no gross  deformities noted and peripheral pulses normal  SKIN: no suspicious lesions or rashes  NEURO: Normal strength and tone, mentation intact and speech normal  PSYCH: mentation appears normal      Diagnostic Test Results:  Labs reviewed in Epic  No results found for this or any previous visit (from the past 24 hour(s)).    ASSESSMENT/PLAN:   Crista was seen today for physical.    Diagnoses and all orders for this visit:    Routine general medical examination at a health care facility  -     REVIEW OF HEALTH MAINTENANCE PROTOCOL ORDERS  -     Adult Eye  Referral; Future  -     Lipid panel reflex to direct LDL Non-fasting; Future  -     Glucose; Future    Encounter for screening mammogram for breast cancer  -     *MA Screening Digital Bilateral; Future    Screen for STD (sexually transmitted disease)  -     Neisseria gonorrhoeae PCR; Future  -     Chlamydia trachomatis PCR; Future  -     Hepatitis C Screen Reflex to HCV RNA Quant and Genotype; Future  -     HIV Antigen Antibody Combo Cascade; Future  -     Treponema Abs w Reflex to RPR and Titer; Future  -     Hepatitis B surface antigen; Future    Encounter for immunization  -     INFLUENZA VACCINE IM > 6 MONTHS VALENT IIV4 (AFLURIA/FLUZONE)  -     TD,PF 7+(TENIVAC)            COUNSELING:  Reviewed preventive health counseling, as reflected in patient instructions       Regular exercise       Healthy diet/nutrition       Vision screening       Immunizations  Vaccinated for: Influenza and Td        BMI:   Estimated body mass index is 32.42 kg/m  as calculated from the following:    Height as of this encounter: 1.524 m (5').    Weight as of this encounter: 75.3 kg (166 lb).   Weight management plan: Discussed healthy diet and exercise guidelines      She reports that she has never smoked. She has never used smokeless tobacco.          Radha Yoon MD MPH    [facility]

## 2023-09-27 LAB
C TRACH DNA SPEC QL NAA+PROBE: NEGATIVE
CHOLEST SERPL-MCNC: 145 MG/DL
FASTING STATUS PATIENT QL REPORTED: NO
GLUCOSE SERPL-MCNC: 95 MG/DL (ref 70–99)
HBV SURFACE AG SERPL QL IA: NONREACTIVE
HCV AB SERPL QL IA: NONREACTIVE
HDLC SERPL-MCNC: 43 MG/DL
HIV 1+2 AB+HIV1 P24 AG SERPL QL IA: NONREACTIVE
LDLC SERPL CALC-MCNC: 84 MG/DL
N GONORRHOEA DNA SPEC QL NAA+PROBE: NEGATIVE
NONHDLC SERPL-MCNC: 102 MG/DL
T PALLIDUM AB SER QL: NONREACTIVE
TRIGL SERPL-MCNC: 90 MG/DL

## 2023-09-27 NOTE — RESULT ENCOUNTER NOTE
Dear Crista Sims    Here are your cholesterol results:    Your LDL is: Lab Results       Component                Value               Date                       LDL                      84                  09/26/2023                 LDL                      96                  09/06/2019          Your LDL goal is to be less than 160  Your HDL is: Lab Results       Component                Value               Date                       HDL                      43                  09/26/2023                 HDL                      50                  09/06/2019           Goal HDL is Greater than 40 (for men) or 50 (for women).  Your Triglycerides are: Lab Results       Component                Value               Date                       TRIG                     90                  09/26/2023                 TRIG                     85                  09/06/2019          Goal TRIGLYCERIDES are less than 150.     Here are some ways to improve your cholesterol without medication:    Try to get at least 45 minutes of aerobic exercise 5-6 days a week  Maintain a healthy body weight  Eat less saturated fats  Buy lean cuts of meat, reduce your portions of red meat or substitute poultry or fish  Avoid fried or fast foods that are high in fat  Eat more fruits and vegetables    Glucose is normal, you do not have diabetes.     Please do not hesitate to call us at [phone] if you have any questions or concerns.    Thank you,    Radha Yoon MD MPH

## 2023-09-27 NOTE — RESULT ENCOUNTER NOTE
Crista,     Tests for Hepatitis C, Hepatitis B, Gonorrhea, Chlamydia, and Syphilis are negative.     Please do not hesitate to call us at [phone] if you have any questions or concerns.    Thank you,    Radha Yoon MD MPH

## 2023-09-27 NOTE — RESULT ENCOUNTER NOTE
Cirsta,     Screening test for HIV is negative.     Please do not hesitate to call us at [phone] if you have any questions or concerns.    Thank you,    Radha Yoon MD MPH

## 2023-12-19 ENCOUNTER — MYC MEDICAL ADVICE (OUTPATIENT)
Dept: FAMILY MEDICINE | Facility: CLINIC | Age: 43
End: 2023-12-19
Payer: COMMERCIAL

## 2024-02-03 ENCOUNTER — HEALTH MAINTENANCE LETTER (OUTPATIENT)
Age: 44
End: 2024-02-03

## 2024-02-05 ENCOUNTER — E-VISIT (OUTPATIENT)
Dept: FAMILY MEDICINE | Facility: CLINIC | Age: 44
End: 2024-02-05

## 2024-02-05 DIAGNOSIS — E66.811 OBESITY, CLASS I, BMI 30-34.9: Primary | ICD-10-CM

## 2024-02-05 PROCEDURE — 99207 PR NON-BILLABLE SERV PER CHARTING: CPT | Performed by: PREVENTIVE MEDICINE

## 2024-02-14 NOTE — PATIENT INSTRUCTIONS
Thank you for choosing us for your care. I think an in-clinic visit would be best next steps based on your symptoms. Please schedule a clinic appointment; you won t be charged for this eVisit.      You can schedule an appointment right here in [facility], or call [phone]

## 2024-03-06 ENCOUNTER — OFFICE VISIT (OUTPATIENT)
Dept: FAMILY MEDICINE | Facility: CLINIC | Age: 44
End: 2024-03-06
Payer: COMMERCIAL

## 2024-03-06 VITALS
TEMPERATURE: 98.2 F | OXYGEN SATURATION: 98 % | HEIGHT: 60 IN | SYSTOLIC BLOOD PRESSURE: 111 MMHG | RESPIRATION RATE: 18 BRPM | DIASTOLIC BLOOD PRESSURE: 76 MMHG | HEART RATE: 79 BPM | WEIGHT: 163 LBS | BODY MASS INDEX: 32 KG/M2

## 2024-03-06 DIAGNOSIS — E66.811 OBESITY, CLASS I, BMI 30-34.9: Primary | ICD-10-CM

## 2024-03-06 PROCEDURE — 99213 OFFICE O/P EST LOW 20 MIN: CPT | Performed by: PREVENTIVE MEDICINE

## 2024-03-06 ASSESSMENT — PAIN SCALES - GENERAL: PAINLEVEL: NO PAIN (0)

## 2024-03-06 NOTE — PROGRESS NOTES
Assessment & Plan     Obesity, Class I, BMI 30-34.9  -GLP 1 receptor agonist do not appear to be covered on patient's insurance program for weight loss  -Importance of lifestyle changes in achieving and maintaining weight loss discussed and stressed  -Patient plans to self-pay for medication, hence we will not proceed with any prior authorization  - tirzepatide-Weight Management (ZEPBOUND) 2.5 MG/0.5ML prefilled pen  Dispense: 2 mL; Refill: 0  - tirzepatide-Weight Management (ZEPBOUND) 5 MG/0.5ML prefilled pen  Dispense: 2 mL; Refill: 0  - tirzepatide-Weight Management (ZEPBOUND) 7.5 MG/0.5ML prefilled pen  Dispense: 2 mL; Refill: 0    Patient was advised to contact insurance plan to determine which weight loss medications are covered. Most insurance plans do not cover these medications (GLP 1 receptor agonists) for Weight loss, they cover for Diabetes. We will not be submitting repeated requests for Prior Authorization with the insurance plan.   Patient does not have a personal or family history of medullary thyroid cancer or pancreatitis.  Discussed that medication causes satiety as food moves through the GI tract slower. This can cause abdominal bloating, nausea and vomiting.   These are chronic lifelong medications for weight loss. They are not taken for a short period of time, once you stop the medication, weight gain recurs.   You have to inject the medication.  There is a nation wide shortage of these medications, especially for the lower starting doses. We will not send the medication to multiple pharmacies if your original pharmacy does not have the medication in stock. It is your responsibility to determine what pharmacy has the medication in stock and provide us with that information.  If the medication is not available, you will need to stop the medication cold turkey, we will not be sending alternatives.   This medication is prescribed with your full understanding of the above.  Patient expressed full  "comprehension of this and would like to proceed.  Common side effects of medications prescribed at this visit were discussed with the patient. Severe side effects, including current applicable black box warnings, were discussed.     Prescription drug management  15 minutes spent by me on the date of the encounter doing chart review, history and exam, documentation and further activities per the note      Subjective   tamiko is a 43 year old, presenting for the following health issues:  Weight Loss        3/6/2024     1:36 PM   Additional Questions   Roomed by martín elliott   Accompanied by none         3/6/2024     1:36 PM   Patient Reported Additional Medications   Patient reports taking the following new medications none     History of Present Illness       Reason for visit:  Weight loss    She eats 0-1 servings of fruits and vegetables daily.She consumes 2 sweetened beverage(s) daily.She exercises with enough effort to increase her heart rate 9 or less minutes per day.  She exercises with enough effort to increase her heart rate 3 or less days per week.   She is taking medications regularly.     Is thinking of self paying for medication.  No personal or family history of medullary thyroid cancer  No personal history of acute pancreatitis.  Called her insurance and they cover \"ozempic\" Discussed that medication may be covered for diabetes but not for weight loss.  No motivation to exercise  Has not made any lifestyle changes    Patient here today to discuss about how to lose weight.          Objective    /76   Pulse 79   Temp 98.2  F (36.8  C) (Oral)   Resp 18   Ht 1.524 m (5')   Wt 73.9 kg (163 lb)   LMP 03/05/2024 (Exact Date)   SpO2 98%   BMI 31.83 kg/m    Body mass index is 31.83 kg/m .  Physical Exam   GENERAL APPEARANCE: healthy, alert and no distress  EYES: Eyes grossly normal to inspection and conjunctivae and sclerae normal  NECK: no adenopathy and trachea midline and normal to palpation  RESP: " lungs clear to auscultation - no rales, rhonchi or wheezes  CV: regular rates and rhythm, normal S1 S2, no S3 or S4 and no murmur, click or rub  ABDOMEN: soft, non-tender and no rebound or guarding   MS: extremities normal- no gross deformities noted and peripheral pulses normal  SKIN: no suspicious lesions or rashes  NEURO: Normal strength and tone, mentation intact and speech normal  PSYCH: mentation appears normal      No results found for this or any previous visit (from the past 24 hour(s)).        Signed Electronically by: Radha Yoon MD MPH

## 2024-03-06 NOTE — PATIENT INSTRUCTIONS
Patient was advised to contact insurance plan to determine which weight loss medications are covered. Most insurance plans do not cover these medications (GLP 1 receptor agonists) for Weight loss, they cover for Diabetes. We will not be submitting repeated requests for Prior Authorization with the insurance plan.   Patient does not have a personal or family history of medullary thyroid cancer or pancreatitis.  Discussed that medication causes satiety as food moves through the GI tract slower. This can cause abdominal bloating, nausea and vomiting.   These are chronic lifelong medications for weight loss. They are not taken for a short period of time, once you stop the medication, weight gain recurs.   You have to inject the medication.  There is a nation wide shortage of these medications, especially for the lower starting doses. We will not send the medication to multiple pharmacies if your original pharmacy does not have the medication in stock. It is your responsibility to determine what pharmacy has the medication in stock and provide us with that information.  If the medication is not available, you will need to stop the medication cold turkey, we will not be sending alternatives.   This medication is prescribed with your full understanding of the above.  Patient expressed full comprehension of this and would like to proceed.

## 2024-05-25 ENCOUNTER — TELEPHONE (OUTPATIENT)
Dept: FAMILY MEDICINE | Facility: CLINIC | Age: 44
End: 2024-05-25
Payer: COMMERCIAL

## 2024-05-25 NOTE — TELEPHONE ENCOUNTER
Plan does not cover tirzepatide-Weight Management (ZEPBOUND) 7.5 MG/0.5ML prefilled pen. Please log on to [email or web address] to initiate PA or switch to alternative medication.    KEY: [id number]

## 2024-06-03 NOTE — TELEPHONE ENCOUNTER
**PA DENIED**    Please close encounter if/when finished.   If provider would like to appeal denial outcome we will need a detailed letter of medical necessity to start the process. Please scan document into patient's profile and re-route this request to the PA team pool for appeal submission.     Thank you,     Frankie Blanco [facility]  Pharmacy Clinic Liaison   [facility]  [email or web address]   Phone: [phone]  Fax: [phone]

## 2024-06-03 NOTE — TELEPHONE ENCOUNTER
PRIOR AUTHORIZATION DENIED    Medication: ZEPBOUND 7.5 MG/0.5ML SC SOAJ  Insurance Company: [employer] - Phone [phone] Fax [phone]  Denial Date: 6/3/2024  Denial Reason(s):     Appeal Information:     Patient Notified: No           Thank you,     Frankie Blanco [facility]  Pharmacy Clinic [facility]  [email or web address]   Phone: [phone]  Fax: [phone]

## 2024-06-03 NOTE — TELEPHONE ENCOUNTER
PA Initiation    Medication: ZEPBOUND 7.5 MG/0.5ML SC SOAJ  Insurance Company: [employer] - Phone [phone] Fax [phone]  Pharmacy Filling the Rx: [id number] - [place], [place] - [address] AT [facility]  Filling Pharmacy Phone: [phone]  Filling Pharmacy Fax: [phone]  Start Date: 6/3/2024         Thank you,     Frankie Blanco [facility]  Pharmacy Clinic [facility]  [email or web address]   Phone: [phone]  Fax: [phone]

## 2024-06-04 NOTE — TELEPHONE ENCOUNTER
Called and spoke to the patient and she understands.  Paty Ramirez MA  [facility]   Primary Care

## 2024-06-04 NOTE — TELEPHONE ENCOUNTER
Please let patient know that her insurance plan does not cover the weight loss medication.  Patient can self pay if she chooses.  Nothing more we can do.  OK to close encounter.   Thanks,  Radha Yoon MD MPH

## 2024-07-08 ENCOUNTER — MYC REFILL (OUTPATIENT)
Dept: FAMILY MEDICINE | Facility: CLINIC | Age: 44
End: 2024-07-08
Payer: COMMERCIAL

## 2024-07-08 DIAGNOSIS — E66.811 OBESITY, CLASS I, BMI 30-34.9: ICD-10-CM

## 2024-11-03 ENCOUNTER — HEALTH MAINTENANCE LETTER (OUTPATIENT)
Age: 44
End: 2024-11-03

## 2025-01-08 ASSESSMENT — PATIENT HEALTH QUESTIONNAIRE - PHQ9
SUM OF ALL RESPONSES TO PHQ QUESTIONS 1-9: 9
SUM OF ALL RESPONSES TO PHQ QUESTIONS 1-9: 9
10. IF YOU CHECKED OFF ANY PROBLEMS, HOW DIFFICULT HAVE THESE PROBLEMS MADE IT FOR YOU TO DO YOUR WORK, TAKE CARE OF THINGS AT HOME, OR GET ALONG WITH OTHER PEOPLE: NOT DIFFICULT AT ALL

## 2025-01-09 ENCOUNTER — OFFICE VISIT (OUTPATIENT)
Dept: FAMILY MEDICINE | Facility: CLINIC | Age: 45
End: 2025-01-09
Payer: COMMERCIAL

## 2025-01-09 VITALS
HEIGHT: 60 IN | WEIGHT: 158 LBS | HEART RATE: 66 BPM | BODY MASS INDEX: 31.02 KG/M2 | TEMPERATURE: 98.3 F | DIASTOLIC BLOOD PRESSURE: 78 MMHG | SYSTOLIC BLOOD PRESSURE: 115 MMHG | RESPIRATION RATE: 20 BRPM | OXYGEN SATURATION: 99 %

## 2025-01-09 DIAGNOSIS — Z90.5 SINGLE KIDNEY: ICD-10-CM

## 2025-01-09 DIAGNOSIS — E66.811 OBESITY, CLASS I, BMI 30-34.9: Primary | ICD-10-CM

## 2025-01-09 DIAGNOSIS — Z90.5 HISTORY OF KIDNEY DONATION: ICD-10-CM

## 2025-01-09 ASSESSMENT — PAIN SCALES - GENERAL: PAINLEVEL_OUTOF10: NO PAIN (0)

## 2025-01-09 NOTE — PROGRESS NOTES
Assessment & Plan     Obesity, Class I, BMI 30-34.9  Crista Sims is a 44 year old female who presents today for follow-up on weight management.  She has been taking Zepbound 10 mg to help her lose weight.  Patient reports she is tolerating medication well without any side effects.  Her starting weight before using Zepbound was 163 pounds, she is currently 158 pounds.  She would like to continue the same dose.  Reviewed side effects with the patient again.  Patient would benefit from continuing pharmacotherapy for weight management. Given his current diagnosis of obesity , I recommend continuing Zepbound therapy as data to support most significant weight loss and patient has no contraindications.   Patient also seeing benefit from reduction in food noise and increased satiety.   I recommend continuing current dose, refill provided.  Follow-up with PCP for more refills.    - tirzepatide-Weight Management (ZEPBOUND) 10 MG/0.5ML prefilled pen; Inject 0.5 mLs (10 mg) subcutaneously every 7 days.    Single kidney  Kidney donor.  No recent labs on file.  Check BMP  - Basic metabolic panel  (Ca, Cl, CO2, Creat, Gluc, K, Na, BUN); Future  - Basic metabolic panel  (Ca, Cl, CO2, Creat, Gluc, K, Na, BUN)    History of kidney donation    - Basic metabolic panel  (Ca, Cl, CO2, Creat, Gluc, K, Na, BUN); Future  - Basic metabolic panel  (Ca, Cl, CO2, Creat, Gluc, K, Na, BUN)          BMI  Estimated body mass index is 30.86 kg/m  as calculated from the following:    Height as of this encounter: 1.524 m (5').    Weight as of this encounter: 71.7 kg (158 lb).   Weight management plan: Discussed healthy diet and exercise guidelines      Work on weight loss  Regular exercise    Subjective   crista is a 44 year old, presenting for the following health issues:  Consult (Wanting to re-start Zepbound)        1/9/2025    11:50 AM   Additional Questions   Roomed by Missy   Accompanied by self     History of Present Illness       Reason for  visit:  Weight loss    She eats 0-1 servings of fruits and vegetables daily.She consumes 3 sweetened beverage(s) daily.She exercises with enough effort to increase her heart rate 9 or less minutes per day.  She exercises with enough effort to increase her heart rate 3 or less days per week.   She is taking medications regularly.         Wt Readings from Last 4 Encounters:   01/09/25 71.7 kg (158 lb)   03/06/24 73.9 kg (163 lb)   09/26/23 75.3 kg (166 lb)   04/30/21 70.8 kg (156 lb)                 Review of Systems  Constitutional, HEENT, cardiovascular, pulmonary, gi and gu systems are negative, except as otherwise noted.      Objective    /78   Pulse 66   Temp 98.3  F (36.8  C) (Tympanic)   Resp 20   Ht 1.524 m (5')   Wt 71.7 kg (158 lb)   LMP 12/23/2024 (Approximate)   SpO2 99%   BMI 30.86 kg/m    Body mass index is 30.86 kg/m .  Physical Exam  Vitals and nursing note reviewed.   Constitutional:       General: She is not in acute distress.     Appearance: Normal appearance. She is not ill-appearing, toxic-appearing or diaphoretic.   HENT:      Head: Normocephalic and atraumatic.   Neurological:      Mental Status: She is alert.                    Signed Electronically by: Alexus Moreno MD

## 2025-04-15 ENCOUNTER — MYC REFILL (OUTPATIENT)
Dept: FAMILY MEDICINE | Facility: CLINIC | Age: 45
End: 2025-04-15
Payer: COMMERCIAL

## 2025-04-15 DIAGNOSIS — E66.811 OBESITY, CLASS I, BMI 30-34.9: ICD-10-CM

## 2025-05-20 ENCOUNTER — PATIENT OUTREACH (OUTPATIENT)
Dept: CARE COORDINATION | Facility: CLINIC | Age: 45
End: 2025-05-20
Payer: COMMERCIAL

## 2025-07-11 ENCOUNTER — ANCILLARY PROCEDURE (OUTPATIENT)
Dept: MAMMOGRAPHY | Facility: CLINIC | Age: 45
End: 2025-07-11
Payer: COMMERCIAL

## 2025-07-11 DIAGNOSIS — Z12.31 VISIT FOR SCREENING MAMMOGRAM: ICD-10-CM

## 2025-07-11 PROCEDURE — 77067 SCR MAMMO BI INCL CAD: CPT | Mod: TC | Performed by: RADIOLOGY

## 2025-07-11 PROCEDURE — 77063 BREAST TOMOSYNTHESIS BI: CPT | Mod: TC | Performed by: RADIOLOGY

## 2025-08-08 ENCOUNTER — MYC REFILL (OUTPATIENT)
Dept: FAMILY MEDICINE | Facility: CLINIC | Age: 45
End: 2025-08-08
Payer: COMMERCIAL

## 2025-08-08 DIAGNOSIS — E66.811 OBESITY, CLASS I, BMI 30-34.9: ICD-10-CM
